# Patient Record
Sex: FEMALE | Race: WHITE | NOT HISPANIC OR LATINO | Employment: FULL TIME | URBAN - METROPOLITAN AREA
[De-identification: names, ages, dates, MRNs, and addresses within clinical notes are randomized per-mention and may not be internally consistent; named-entity substitution may affect disease eponyms.]

---

## 2023-08-19 PROBLEM — R60.0 EDEMA OF EXTREMITIES: Status: ACTIVE | Noted: 2020-03-10

## 2023-08-19 PROBLEM — B00.1 RECURRENT COLD SORES: Status: ACTIVE | Noted: 2020-03-10

## 2023-08-19 PROBLEM — J20.9 BRONCHOSPASM WITH BRONCHITIS, ACUTE: Status: ACTIVE | Noted: 2022-07-13

## 2023-08-19 PROBLEM — E78.00 ELEVATED CHOLESTEROL: Status: ACTIVE | Noted: 2021-11-11

## 2023-08-19 PROBLEM — E03.9 PRIMARY HYPOTHYROIDISM: Status: ACTIVE | Noted: 2021-04-27

## 2023-08-19 PROBLEM — E55.9 VITAMIN D DEFICIENCY: Status: ACTIVE | Noted: 2020-03-10

## 2023-08-19 PROBLEM — R92.8 ABNORMAL MAMMOGRAM: Status: ACTIVE | Noted: 2023-08-19

## 2023-08-19 PROBLEM — N95.1 HOT FLASHES DUE TO MENOPAUSE: Status: ACTIVE | Noted: 2021-04-27

## 2023-08-19 PROBLEM — E53.8 COBALAMIN DEFICIENCY: Status: ACTIVE | Noted: 2020-03-10

## 2023-08-19 PROBLEM — F41.9 ANXIETY: Status: ACTIVE | Noted: 2021-04-27

## 2023-08-19 PROBLEM — E78.2 MIXED HYPERLIPIDEMIA: Status: ACTIVE | Noted: 2022-05-24

## 2023-08-19 RX ORDER — LIFITEGRAST 50 MG/ML
SOLUTION/ DROPS OPHTHALMIC
COMMUNITY
Start: 2021-11-11

## 2023-08-19 RX ORDER — VITAMIN B COMPLEX
1 TABLET ORAL
COMMUNITY
Start: 2020-10-08 | End: 2024-04-10 | Stop reason: SDUPTHER

## 2023-08-19 RX ORDER — PNV NO.95/FERROUS FUM/FOLIC AC 28MG-0.8MG
TABLET ORAL
COMMUNITY
End: 2023-10-04

## 2023-08-19 RX ORDER — VENLAFAXINE HYDROCHLORIDE 150 MG/1
150 CAPSULE, EXTENDED RELEASE ORAL DAILY
COMMUNITY
Start: 2023-06-26 | End: 2023-10-04 | Stop reason: SDUPTHER

## 2023-08-19 RX ORDER — VIT C/E/ZN/COPPR/LUTEIN/ZEAXAN 250MG-90MG
CAPSULE ORAL
COMMUNITY
End: 2023-10-04

## 2023-08-19 RX ORDER — ESCITALOPRAM OXALATE 20 MG/1
TABLET ORAL
COMMUNITY
End: 2023-10-04

## 2023-08-19 RX ORDER — ACETAMINOPHEN 500 MG
TABLET ORAL DAILY
COMMUNITY
Start: 2020-03-10 | End: 2023-10-04

## 2023-08-19 RX ORDER — TRIAMTERENE/HYDROCHLOROTHIAZID 37.5-25 MG
1 TABLET ORAL DAILY PRN
COMMUNITY
Start: 2021-11-11 | End: 2023-10-04 | Stop reason: SDUPTHER

## 2023-08-19 RX ORDER — LEVOTHYROXINE SODIUM 50 UG/1
1 TABLET ORAL DAILY
COMMUNITY
Start: 2023-06-26 | End: 2023-10-04 | Stop reason: SDUPTHER

## 2023-08-19 RX ORDER — THYROID 15 MG/1
15 TABLET ORAL DAILY
COMMUNITY
Start: 2023-06-26 | End: 2023-10-04 | Stop reason: SDUPTHER

## 2023-10-04 ENCOUNTER — LAB (OUTPATIENT)
Dept: LAB | Facility: LAB | Age: 58
End: 2023-10-04
Payer: COMMERCIAL

## 2023-10-04 ENCOUNTER — OFFICE VISIT (OUTPATIENT)
Dept: PRIMARY CARE | Facility: CLINIC | Age: 58
End: 2023-10-04
Payer: COMMERCIAL

## 2023-10-04 VITALS
BODY MASS INDEX: 29.16 KG/M2 | DIASTOLIC BLOOD PRESSURE: 80 MMHG | WEIGHT: 162 LBS | OXYGEN SATURATION: 98 % | SYSTOLIC BLOOD PRESSURE: 138 MMHG | HEART RATE: 82 BPM | TEMPERATURE: 96.8 F

## 2023-10-04 DIAGNOSIS — E53.8 VITAMIN B12 DEFICIENCY: Chronic | ICD-10-CM

## 2023-10-04 DIAGNOSIS — E06.3 HYPOTHYROIDISM DUE TO HASHIMOTO'S THYROIDITIS: Chronic | ICD-10-CM

## 2023-10-04 DIAGNOSIS — E03.8 HYPOTHYROIDISM DUE TO HASHIMOTO'S THYROIDITIS: Chronic | ICD-10-CM

## 2023-10-04 DIAGNOSIS — Z01.89 ENCOUNTER FOR ROUTINE LABORATORY TESTING: ICD-10-CM

## 2023-10-04 DIAGNOSIS — E78.2 MIXED HYPERLIPIDEMIA: Chronic | ICD-10-CM

## 2023-10-04 DIAGNOSIS — R73.9 HYPERGLYCEMIA: ICD-10-CM

## 2023-10-04 DIAGNOSIS — F41.9 ANXIETY: Chronic | ICD-10-CM

## 2023-10-04 DIAGNOSIS — E55.9 VITAMIN D DEFICIENCY: Chronic | ICD-10-CM

## 2023-10-04 DIAGNOSIS — G43.909 MIGRAINE SYNDROME: Chronic | ICD-10-CM

## 2023-10-04 DIAGNOSIS — Z12.31 ENCOUNTER FOR SCREENING MAMMOGRAM FOR BREAST CANCER: ICD-10-CM

## 2023-10-04 DIAGNOSIS — Z13.6 ENCOUNTER FOR SCREENING FOR CARDIOVASCULAR DISORDERS: ICD-10-CM

## 2023-10-04 DIAGNOSIS — E78.2 MIXED HYPERLIPIDEMIA: Primary | Chronic | ICD-10-CM

## 2023-10-04 DIAGNOSIS — I87.2 VENOUS INSUFFICIENCY: Chronic | ICD-10-CM

## 2023-10-04 LAB
25(OH)D3 SERPL-MCNC: 27 NG/ML (ref 31–100)
ALBUMIN SERPL-MCNC: 4.6 G/DL (ref 3.5–5)
ALP BLD-CCNC: 82 U/L (ref 35–125)
ALT SERPL-CCNC: 14 U/L (ref 5–40)
ANION GAP SERPL CALC-SCNC: 10 MMOL/L
AST SERPL-CCNC: 17 U/L (ref 5–40)
BASOPHILS # BLD AUTO: 0.02 X10*3/UL (ref 0–0.1)
BASOPHILS NFR BLD AUTO: 0.6 %
BILIRUB DIRECT SERPL-MCNC: <0.2 MG/DL (ref 0–0.2)
BILIRUB SERPL-MCNC: 0.4 MG/DL (ref 0.1–1.2)
BUN SERPL-MCNC: 11 MG/DL (ref 8–25)
CALCIUM SERPL-MCNC: 9.5 MG/DL (ref 8.5–10.4)
CHLORIDE SERPL-SCNC: 101 MMOL/L (ref 97–107)
CHOLEST SERPL-MCNC: 269 MG/DL (ref 133–200)
CHOLEST/HDLC SERPL: 4.8 {RATIO}
CO2 SERPL-SCNC: 25 MMOL/L (ref 24–31)
CREAT SERPL-MCNC: 0.8 MG/DL (ref 0.4–1.6)
EOSINOPHIL # BLD AUTO: 0 X10*3/UL (ref 0–0.7)
EOSINOPHIL NFR BLD AUTO: 0 %
ERYTHROCYTE [DISTWIDTH] IN BLOOD BY AUTOMATED COUNT: 12.1 % (ref 11.5–14.5)
EST. AVERAGE GLUCOSE BLD GHB EST-MCNC: 97 MG/DL
GFR SERPL CREATININE-BSD FRML MDRD: 85 ML/MIN/1.73M*2
GLUCOSE SERPL-MCNC: 81 MG/DL (ref 65–99)
HBA1C MFR BLD: 5 %
HCT VFR BLD AUTO: 39.6 % (ref 36–46)
HDLC SERPL-MCNC: 56 MG/DL
HGB BLD-MCNC: 13.5 G/DL (ref 12–16)
IMM GRANULOCYTES # BLD AUTO: 0.01 X10*3/UL (ref 0–0.7)
IMM GRANULOCYTES NFR BLD AUTO: 0.3 % (ref 0–0.9)
LDLC SERPL CALC-MCNC: 174 MG/DL (ref 65–130)
LYMPHOCYTES # BLD AUTO: 1.66 X10*3/UL (ref 1.2–4.8)
LYMPHOCYTES NFR BLD AUTO: 48.4 %
MCH RBC QN AUTO: 30.5 PG (ref 26–34)
MCHC RBC AUTO-ENTMCNC: 34.1 G/DL (ref 32–36)
MCV RBC AUTO: 89 FL (ref 80–100)
MONOCYTES # BLD AUTO: 0.18 X10*3/UL (ref 0.1–1)
MONOCYTES NFR BLD AUTO: 5.2 %
NEUTROPHILS # BLD AUTO: 1.56 X10*3/UL (ref 1.2–7.7)
NEUTROPHILS NFR BLD AUTO: 45.5 %
NRBC BLD-RTO: 0 /100 WBCS (ref 0–0)
PLATELET # BLD AUTO: 227 X10*3/UL (ref 150–450)
PMV BLD AUTO: 10 FL (ref 7.5–11.5)
POTASSIUM SERPL-SCNC: 4.2 MMOL/L (ref 3.4–5.1)
PROT SERPL-MCNC: 6.9 G/DL (ref 5.9–7.9)
RBC # BLD AUTO: 4.43 X10*6/UL (ref 4–5.2)
SODIUM SERPL-SCNC: 136 MMOL/L (ref 133–145)
T3 SERPL-MCNC: 105 NG/DL (ref 60–200)
T4 FREE SERPL-MCNC: 1.2 NG/DL (ref 0.9–1.7)
TRIGL SERPL-MCNC: 193 MG/DL (ref 40–150)
TSH SERPL DL<=0.05 MIU/L-ACNC: 1.86 MIU/L (ref 0.27–4.2)
VIT B12 SERPL-MCNC: 703 PG/ML (ref 211–946)
WBC # BLD AUTO: 3.4 X10*3/UL (ref 4.4–11.3)

## 2023-10-04 PROCEDURE — 99214 OFFICE O/P EST MOD 30 MIN: CPT | Performed by: STUDENT IN AN ORGANIZED HEALTH CARE EDUCATION/TRAINING PROGRAM

## 2023-10-04 PROCEDURE — 84480 ASSAY TRIIODOTHYRONINE (T3): CPT

## 2023-10-04 PROCEDURE — 36415 COLL VENOUS BLD VENIPUNCTURE: CPT

## 2023-10-04 PROCEDURE — 1036F TOBACCO NON-USER: CPT | Performed by: STUDENT IN AN ORGANIZED HEALTH CARE EDUCATION/TRAINING PROGRAM

## 2023-10-04 RX ORDER — LEVOTHYROXINE SODIUM 50 UG/1
50 TABLET ORAL DAILY
Qty: 90 TABLET | Refills: 3 | Status: SHIPPED | OUTPATIENT
Start: 2023-10-04 | End: 2024-04-10 | Stop reason: SDUPTHER

## 2023-10-04 RX ORDER — VENLAFAXINE HYDROCHLORIDE 150 MG/1
150 CAPSULE, EXTENDED RELEASE ORAL DAILY
Qty: 90 CAPSULE | Refills: 3 | Status: SHIPPED | OUTPATIENT
Start: 2023-10-04 | End: 2024-04-10 | Stop reason: SDUPTHER

## 2023-10-04 RX ORDER — TRIAMTERENE/HYDROCHLOROTHIAZID 37.5-25 MG
1 TABLET ORAL DAILY PRN
Start: 2023-10-04 | End: 2024-04-10 | Stop reason: SDUPTHER

## 2023-10-04 RX ORDER — THYROID 15 MG/1
15 TABLET ORAL DAILY
Qty: 90 TABLET | Refills: 3 | Status: SHIPPED | OUTPATIENT
Start: 2023-10-04 | End: 2023-10-04

## 2023-10-04 RX ORDER — THYROID 15 MG/1
15 TABLET ORAL DAILY
Qty: 90 TABLET | Refills: 3 | Status: SHIPPED | OUTPATIENT
Start: 2023-10-04 | End: 2024-01-29 | Stop reason: SDUPTHER

## 2023-10-04 ASSESSMENT — ENCOUNTER SYMPTOMS
DEPRESSION: 0
GASTROINTESTINAL NEGATIVE: 1
LOSS OF SENSATION IN FEET: 0
CONSTITUTIONAL NEGATIVE: 1
OCCASIONAL FEELINGS OF UNSTEADINESS: 0
RESPIRATORY NEGATIVE: 1
CARDIOVASCULAR NEGATIVE: 1

## 2023-10-04 ASSESSMENT — PAIN SCALES - GENERAL: PAINLEVEL: 0-NO PAIN

## 2023-10-04 NOTE — PATIENT INSTRUCTIONS
Diagnoses and all orders for this visit:  Mixed hyperlipidemia  -     Lipid Panel; Future  -     CT angio coronary art with heartflow if score >30%; Future  Hypothyroidism due to Hashimoto's thyroiditis  Comments:  - Patient being treated with levothyroxine and thyroid armour.  - Needs labs every 6 months.  Orders:  -     thyroid, pork, ([place] Thyroid) 15 mg tablet; Take 1 tablet (15 mg) by mouth once daily.  -     levothyroxine (Synthroid, Levoxyl) 50 mcg tablet; Take 1 tablet (50 mcg) by mouth once daily.  -     Thyroid Stimulating Hormone; Future  -     Thyroxine, Free; Future  -     Tsh With Reflex To Free T4 If Abnormal; Future  -     T4, free; Future  -     T3; Future  -     T3; Future  Vitamin B12 deficiency  -     Vitamin B12; Future  Vitamin D deficiency  -     Vitamin D 25-Hydroxy,Total (for eval of Vitamin D levels); Future  Venous insufficiency  Comments:  - Patient takes triamterene-HCTZ as-needed for this.  - Is working well, will not make changes.  Orders:  -     triamterene-hydrochlorothiazid (Maxzide-25) 37.5-25 mg tablet; Take 1 tablet by mouth once daily as needed (swelling).  Migraine syndrome  Comments:  - Has not been giving us problems for awhile.  Anxiety  Comments:  - Stable. No changes at this time.  - Blood pressure mildly elevated today, but I suspect this is anxiety-related.  Orders:  -     venlafaxine XR (Effexor-XR) 150 mg 24 hr capsule; Take 1 capsule (150 mg) by mouth once daily.  Encounter for routine laboratory testing  Comments:  - Will order bulk of labwork for now.  - Will also order relevant 6-month labs.  Orders:  -     Thyroid Stimulating Hormone; Future  -     Thyroxine, Free; Future  -     Basic Metabolic Panel; Future  -     CBC and Auto Differential; Future  -     Hemoglobin A1C; Future  -     Hepatic Function Panel; Future  -     Lipid Panel; Future  -     Vitamin D 25-Hydroxy,Total (for eval of Vitamin D levels); Future  -     Basic Metabolic Panel; Future  -      Hepatic Function Panel; Future  -     CBC and Auto Differential; Future  -     Tsh With Reflex To Free T4 If Abnormal; Future  -     T4, free; Future  -     T3; Future  -     T3; Future  -     Vitamin B12; Future  Hyperglycemia  -     Hemoglobin A1C; Future  Encounter for screening for cardiovascular disorders  -     CT angio coronary art with heartflow if score >30%; Future  Encounter for screening mammogram for breast cancer  -     BI mammo bilateral screening tomosynthesis; Future

## 2023-10-04 NOTE — PROGRESS NOTES
[facility]: [facility]  PROGRESS NOTE      Rebeca Nicolas is a 58 y.o. female that is presenting today for Establish Care (NP).    ASSESSMENT / PLAN:  Diagnoses and all orders for this visit:  Mixed hyperlipidemia  -     Lipid Panel; Future  -     CT angio coronary art with heartflow if score >30%; Future  Hypothyroidism due to Hashimoto's thyroiditis  Comments:  - Patient being treated with levothyroxine and thyroid armour.  - Needs labs every 6 months.  Orders:  -     thyroid, pork, ([place] Thyroid) 15 mg tablet; Take 1 tablet (15 mg) by mouth once daily.  -     levothyroxine (Synthroid, Levoxyl) 50 mcg tablet; Take 1 tablet (50 mcg) by mouth once daily.  -     Thyroid Stimulating Hormone; Future  -     Thyroxine, Free; Future  -     Tsh With Reflex To Free T4 If Abnormal; Future  -     T4, free; Future  -     T3; Future  -     T3; Future  Vitamin B12 deficiency  -     Vitamin B12; Future  Vitamin D deficiency  -     Vitamin D 25-Hydroxy,Total (for eval of Vitamin D levels); Future  Venous insufficiency  Comments:  - Patient takes triamterene-HCTZ as-needed for this.  - Is working well, will not make changes.  Orders:  -     triamterene-hydrochlorothiazid (Maxzide-25) 37.5-25 mg tablet; Take 1 tablet by mouth once daily as needed (swelling).  Migraine syndrome  Comments:  - Has not been giving us problems for awhile.  Anxiety  Comments:  - Stable. No changes at this time.  - Blood pressure mildly elevated today, but I suspect this is anxiety-related.  Orders:  -     venlafaxine XR (Effexor-XR) 150 mg 24 hr capsule; Take 1 capsule (150 mg) by mouth once daily.  Encounter for routine laboratory testing  -     Thyroid Stimulating Hormone; Future  -     Thyroxine, Free; Future  -     Basic Metabolic Panel; Future  -     CBC and Auto Differential; Future  -     Hemoglobin A1C; Future  -     Hepatic Function Panel; Future  -     Lipid Panel; Future  -     Vitamin D 25-Hydroxy,Total (for eval of  Vitamin D levels); Future  -     Basic Metabolic Panel; Future  -     Hepatic Function Panel; Future  -     CBC and Auto Differential; Future  -     Tsh With Reflex To Free T4 If Abnormal; Future  -     T4, free; Future  -     T3; Future  -     T3; Future  -     Vitamin B12; Future  Hyperglycemia  -     Hemoglobin A1C; Future  Encounter for screening for cardiovascular disorders  -     CT angio coronary art with heartflow if score >30%; Future  Encounter for screening mammogram for breast cancer  -     BI mammo bilateral screening tomosynthesis; Future    Subjective   - The patient feels well and denies any acute symptoms or concerns at this time.   - The patient denies any changes or progression of their chronic medical problems.  - The patient denies any problems or concerns with their medications.    Review of Systems   Constitutional: Negative.    Respiratory: Negative.     Cardiovascular: Negative.    Gastrointestinal: Negative.       Objective   Vitals:    10/04/23 0850   BP: 138/80   Pulse: 82   Temp: 36 °C (96.8 °F)   SpO2: 98%      Body mass index is 29.16 kg/m².  Physical Exam  Constitutional:       General: She is not in acute distress.  Neck:      Vascular: No carotid bruit.   Cardiovascular:      Rate and Rhythm: Normal rate and regular rhythm.      Heart sounds: Normal heart sounds.   Pulmonary:      Effort: Pulmonary effort is normal.      Breath sounds: Normal breath sounds.   Musculoskeletal:         General: No swelling.   Neurological:      Mental Status: She is alert. Mental status is at baseline.   Psychiatric:         Mood and Affect: Mood normal.     Diagnostic Results   Lab Results   Component Value Date    GLUCOSE 77 10/08/2020    CALCIUM 9.7 10/08/2020     10/08/2020    K 3.8 10/08/2020    CO2 25 10/08/2020     10/08/2020    BUN 16 10/08/2020    CREATININE 0.8 10/08/2020     Lab Results   Component Value Date    ALT 17 03/10/2020    AST 19 03/10/2020    ALKPHOS 82 03/10/2020     "BILITOT 0.4 03/10/2020     No results found for: \"WBC\", \"HGB\", \"HCT\", \"MCV\", \"PLT\"  No results found for: \"CHOL\"  No results found for: \"HDL\"  No results found for: \"LDLCALC\"  No results found for: \"TRIG\"  No components found for: \"CHOLHDL\"  No results found for: \"HGBA1C\"  Other labs not included in the list above were reviewed either before or during this encounter.    History    Past Medical History:   Diagnosis Date   • Anxiety and depression    • Thyroid disease      Past Surgical History:   Procedure Laterality Date   • CHOLECYSTECTOMY  05/15/2014    Cholecystectomy   • OTHER SURGICAL HISTORY  05/15/2014    Gynecologic Serv Endometrial Cryoablation W/ Ultrason Guid   • OTHER SURGICAL HISTORY  11/29/2018    Hand surgery     Family History   Problem Relation Name Age of Onset   • Diabetes Mother     • Hypertension Mother     • No Known Problems Father       No Known Allergies  Current Outpatient Medications on File Prior to Visit   Medication Sig Dispense Refill   • cyanocobalamin, vitamin B-12, (Vitamin B-12) 2,500 mcg tablet, sublingual Take 1 tablet (2,500 mcg) by mouth every 3 days.     • lifitegrast (Xiidra) 5 % dropperette 0 Refill(s), Type: Maintenance     • [DISCONTINUED] cyanocobalamin (Vitamin B-12) 100 mcg tablet Take by mouth.     • [DISCONTINUED] levothyroxine (Synthroid, Levoxyl) 50 mcg tablet Take 1 tablet (50 mcg) by mouth once daily.     • [DISCONTINUED] thyroid, pork, ([place] Thyroid) 15 mg tablet Take 1 tablet (15 mg) by mouth once daily. medically necessary to take with levothyroxine     • [DISCONTINUED] triamterene-hydrochlorothiazid (Maxzide-25) 37.5-25 mg tablet Take 1 tablet by mouth once daily as needed.     • [DISCONTINUED] venlafaxine XR (Effexor-XR) 150 mg 24 hr capsule Take 1 capsule (150 mg) by mouth once daily.     • [DISCONTINUED] cholecalciferol (Vitamin D-3) 25 MCG (1000 UT) capsule Take by mouth.     • [DISCONTINUED] cholecalciferol (Vitamin D-3) 5,000 Units tablet Take by mouth " once daily.     • [DISCONTINUED] escitalopram (Lexapro) 20 mg tablet Take by mouth.     • [DISCONTINUED] omega-3 fatty acids-fish oil (Fish OiL) 360-1,200 mg capsule Take by mouth.       No current facility-administered medications on file prior to visit.     Immunization History   Administered Date(s) Administered   • Influenza Whole 09/25/2013   • Influenza, Unspecified 11/11/2003, 09/25/2021   • [employer] SARS-CoV-2 Vaccination 04/12/2021   • Pfizer COVID-19 vaccine, bivalent, age 12 years and older (30 mcg/0.3 mL) 11/23/2022, 11/23/2022   • Pfizer Purple Cap SARS-CoV-2 12/07/2021   • Tdap vaccine, age 7 year and older (BOOSTRIX) 09/29/2015, 12/08/2019   • Tetanus Toxoid, Unspecified 09/29/2015     Patient's medical history was reviewed and updated either before or during this encounter.    Jamie Reece MD

## 2023-10-05 DIAGNOSIS — E78.2 MIXED HYPERLIPIDEMIA: Primary | ICD-10-CM

## 2023-10-05 DIAGNOSIS — D70.9 NEUTROPENIA, UNSPECIFIED TYPE (CMS-HCC): ICD-10-CM

## 2023-10-05 RX ORDER — ROSUVASTATIN CALCIUM 5 MG/1
5 TABLET, COATED ORAL DAILY
Qty: 90 TABLET | Refills: 3 | Status: SHIPPED | OUTPATIENT
Start: 2023-10-05 | End: 2024-04-10 | Stop reason: ALTCHOICE

## 2023-10-11 ENCOUNTER — TELEPHONE (OUTPATIENT)
Dept: PRIMARY CARE | Facility: CLINIC | Age: 58
End: 2023-10-11
Payer: COMMERCIAL

## 2023-10-11 DIAGNOSIS — Z00.00 ENCOUNTER FOR SCREENING AND PREVENTATIVE CARE: ICD-10-CM

## 2023-10-16 ENCOUNTER — HOSPITAL ENCOUNTER (OUTPATIENT)
Dept: RADIOLOGY | Facility: HOSPITAL | Age: 58
Discharge: HOME | End: 2023-10-16
Payer: COMMERCIAL

## 2023-10-16 DIAGNOSIS — Z00.00 ENCOUNTER FOR SCREENING AND PREVENTATIVE CARE: ICD-10-CM

## 2023-10-16 PROCEDURE — 75571 CT HRT W/O DYE W/CA TEST: CPT

## 2023-10-18 ENCOUNTER — TELEPHONE (OUTPATIENT)
Dept: PRIMARY CARE | Facility: CLINIC | Age: 58
End: 2023-10-18
Payer: COMMERCIAL

## 2023-10-25 ENCOUNTER — TELEPHONE (OUTPATIENT)
Dept: PRIMARY CARE | Facility: CLINIC | Age: 58
End: 2023-10-25
Payer: COMMERCIAL

## 2024-01-29 ENCOUNTER — TELEPHONE (OUTPATIENT)
Dept: PRIMARY CARE | Facility: CLINIC | Age: 59
End: 2024-01-29
Payer: COMMERCIAL

## 2024-01-29 ENCOUNTER — HOSPITAL ENCOUNTER (OUTPATIENT)
Dept: RADIOLOGY | Facility: HOSPITAL | Age: 59
Discharge: HOME | End: 2024-01-29
Payer: COMMERCIAL

## 2024-01-29 VITALS — BODY MASS INDEX: 28.35 KG/M2 | WEIGHT: 160 LBS | HEIGHT: 63 IN

## 2024-01-29 DIAGNOSIS — Z12.31 ENCOUNTER FOR SCREENING MAMMOGRAM FOR BREAST CANCER: ICD-10-CM

## 2024-01-29 DIAGNOSIS — E06.3 HYPOTHYROIDISM DUE TO HASHIMOTO'S THYROIDITIS: Chronic | ICD-10-CM

## 2024-01-29 DIAGNOSIS — E03.8 HYPOTHYROIDISM DUE TO HASHIMOTO'S THYROIDITIS: Chronic | ICD-10-CM

## 2024-01-29 PROCEDURE — 77067 SCR MAMMO BI INCL CAD: CPT

## 2024-01-30 RX ORDER — THYROID 15 MG/1
15 TABLET ORAL DAILY
Qty: 90 TABLET | Refills: 3 | Status: SHIPPED | OUTPATIENT
Start: 2024-01-30 | End: 2024-04-10 | Stop reason: SDUPTHER

## 2024-02-01 ENCOUNTER — APPOINTMENT (OUTPATIENT)
Dept: RADIOLOGY | Facility: HOSPITAL | Age: 59
End: 2024-02-01
Payer: COMMERCIAL

## 2024-04-10 ENCOUNTER — OFFICE VISIT (OUTPATIENT)
Dept: PRIMARY CARE | Facility: CLINIC | Age: 59
End: 2024-04-10
Payer: COMMERCIAL

## 2024-04-10 ENCOUNTER — LAB (OUTPATIENT)
Dept: LAB | Facility: LAB | Age: 59
End: 2024-04-10
Payer: COMMERCIAL

## 2024-04-10 VITALS
BODY MASS INDEX: 28.7 KG/M2 | OXYGEN SATURATION: 97 % | WEIGHT: 162 LBS | SYSTOLIC BLOOD PRESSURE: 122 MMHG | HEIGHT: 63 IN | HEART RATE: 83 BPM | DIASTOLIC BLOOD PRESSURE: 80 MMHG

## 2024-04-10 DIAGNOSIS — E78.2 MIXED HYPERLIPIDEMIA: ICD-10-CM

## 2024-04-10 DIAGNOSIS — D70.9 NEUTROPENIA, UNSPECIFIED TYPE (CMS-HCC): ICD-10-CM

## 2024-04-10 DIAGNOSIS — R73.9 HYPERGLYCEMIA: ICD-10-CM

## 2024-04-10 DIAGNOSIS — E55.9 VITAMIN D DEFICIENCY: ICD-10-CM

## 2024-04-10 DIAGNOSIS — E03.8 HYPOTHYROIDISM DUE TO HASHIMOTO'S THYROIDITIS: Primary | ICD-10-CM

## 2024-04-10 DIAGNOSIS — Z01.89 ENCOUNTER FOR ROUTINE LABORATORY TESTING: ICD-10-CM

## 2024-04-10 DIAGNOSIS — G43.909 MIGRAINE SYNDROME: ICD-10-CM

## 2024-04-10 DIAGNOSIS — E53.8 VITAMIN B12 DEFICIENCY: ICD-10-CM

## 2024-04-10 DIAGNOSIS — F41.9 ANXIETY: ICD-10-CM

## 2024-04-10 DIAGNOSIS — I87.2 VENOUS INSUFFICIENCY: ICD-10-CM

## 2024-04-10 DIAGNOSIS — E03.8 HYPOTHYROIDISM DUE TO HASHIMOTO'S THYROIDITIS: ICD-10-CM

## 2024-04-10 DIAGNOSIS — E06.3 HYPOTHYROIDISM DUE TO HASHIMOTO'S THYROIDITIS: ICD-10-CM

## 2024-04-10 DIAGNOSIS — E06.3 HYPOTHYROIDISM DUE TO HASHIMOTO'S THYROIDITIS: Primary | ICD-10-CM

## 2024-04-10 PROBLEM — J20.9 BRONCHOSPASM WITH BRONCHITIS, ACUTE: Status: RESOLVED | Noted: 2022-07-13 | Resolved: 2024-04-10

## 2024-04-10 PROBLEM — E78.5 HLD (HYPERLIPIDEMIA): Status: ACTIVE | Noted: 2022-05-24

## 2024-04-10 LAB
BASOPHILS # BLD AUTO: 0.01 X10*3/UL (ref 0–0.1)
BASOPHILS NFR BLD AUTO: 0.3 %
EOSINOPHIL # BLD AUTO: 0 X10*3/UL (ref 0–0.7)
EOSINOPHIL NFR BLD AUTO: 0 %
ERYTHROCYTE [DISTWIDTH] IN BLOOD BY AUTOMATED COUNT: 12.7 % (ref 11.5–14.5)
FOLATE SERPL-MCNC: 13.4 NG/ML (ref 4.2–19.9)
HCT VFR BLD AUTO: 40.7 % (ref 36–46)
HGB BLD-MCNC: 13.5 G/DL (ref 12–16)
IMM GRANULOCYTES # BLD AUTO: 0 X10*3/UL (ref 0–0.7)
IMM GRANULOCYTES NFR BLD AUTO: 0 % (ref 0–0.9)
LYMPHOCYTES # BLD AUTO: 1.34 X10*3/UL (ref 1.2–4.8)
LYMPHOCYTES NFR BLD AUTO: 38.6 %
MCH RBC QN AUTO: 30.3 PG (ref 26–34)
MCHC RBC AUTO-ENTMCNC: 33.2 G/DL (ref 32–36)
MCV RBC AUTO: 91 FL (ref 80–100)
MONOCYTES # BLD AUTO: 0.18 X10*3/UL (ref 0.1–1)
MONOCYTES NFR BLD AUTO: 5.2 %
NEUTROPHILS # BLD AUTO: 1.94 X10*3/UL (ref 1.2–7.7)
NEUTROPHILS NFR BLD AUTO: 55.9 %
NRBC BLD-RTO: 0 /100 WBCS (ref 0–0)
PLATELET # BLD AUTO: 218 X10*3/UL (ref 150–450)
RBC # BLD AUTO: 4.46 X10*6/UL (ref 4–5.2)
TSH SERPL DL<=0.05 MIU/L-ACNC: 0.9 MIU/L (ref 0.27–4.2)
VIT B12 SERPL-MCNC: 648 PG/ML (ref 211–946)
WBC # BLD AUTO: 3.5 X10*3/UL (ref 4.4–11.3)

## 2024-04-10 PROCEDURE — 82607 VITAMIN B-12: CPT

## 2024-04-10 PROCEDURE — 99214 OFFICE O/P EST MOD 30 MIN: CPT | Performed by: STUDENT IN AN ORGANIZED HEALTH CARE EDUCATION/TRAINING PROGRAM

## 2024-04-10 PROCEDURE — 82746 ASSAY OF FOLIC ACID SERUM: CPT

## 2024-04-10 PROCEDURE — 84443 ASSAY THYROID STIM HORMONE: CPT

## 2024-04-10 PROCEDURE — 1036F TOBACCO NON-USER: CPT | Performed by: STUDENT IN AN ORGANIZED HEALTH CARE EDUCATION/TRAINING PROGRAM

## 2024-04-10 PROCEDURE — 36415 COLL VENOUS BLD VENIPUNCTURE: CPT

## 2024-04-10 PROCEDURE — 85025 COMPLETE CBC W/AUTO DIFF WBC: CPT

## 2024-04-10 RX ORDER — THYROID 15 MG/1
15 TABLET ORAL DAILY
Qty: 90 TABLET | Refills: 3 | Status: SHIPPED | OUTPATIENT
Start: 2024-04-10 | End: 2025-04-10

## 2024-04-10 RX ORDER — MULTIVITAMIN
1 TABLET ORAL DAILY
Start: 2024-04-10

## 2024-04-10 RX ORDER — LEVOTHYROXINE SODIUM 50 UG/1
50 TABLET ORAL DAILY
Qty: 90 TABLET | Refills: 3 | Status: SHIPPED | OUTPATIENT
Start: 2024-04-10 | End: 2025-04-10

## 2024-04-10 RX ORDER — VITAMIN B COMPLEX
1 TABLET ORAL
Start: 2024-04-10

## 2024-04-10 RX ORDER — VENLAFAXINE HYDROCHLORIDE 150 MG/1
150 CAPSULE, EXTENDED RELEASE ORAL DAILY
Qty: 90 CAPSULE | Refills: 3 | Status: SHIPPED | OUTPATIENT
Start: 2024-04-10 | End: 2025-04-10

## 2024-04-10 RX ORDER — TRIAMTERENE/HYDROCHLOROTHIAZID 37.5-25 MG
1 TABLET ORAL DAILY PRN
Qty: 90 TABLET | Refills: 0 | Status: SHIPPED | OUTPATIENT
Start: 2024-04-10

## 2024-04-10 ASSESSMENT — ENCOUNTER SYMPTOMS
GASTROINTESTINAL NEGATIVE: 1
CARDIOVASCULAR NEGATIVE: 1
CONSTITUTIONAL NEGATIVE: 1
RESPIRATORY NEGATIVE: 1

## 2024-04-10 ASSESSMENT — PATIENT HEALTH QUESTIONNAIRE - PHQ9
1. LITTLE INTEREST OR PLEASURE IN DOING THINGS: NOT AT ALL
2. FEELING DOWN, DEPRESSED OR HOPELESS: NOT AT ALL
SUM OF ALL RESPONSES TO PHQ9 QUESTIONS 1 AND 2: 0

## 2024-04-10 ASSESSMENT — PAIN SCALES - GENERAL: PAINLEVEL: 0-NO PAIN

## 2024-04-10 NOTE — PROGRESS NOTES
[facility]: [facility]  PROGRESS NOTE      Rebeca Nicolas is a 58 y.o. female that is presenting today for Follow-up (6 month ).    Assessment/Plan   Diagnoses and all orders for this visit:  Hypothyroidism due to Hashimoto's thyroiditis  -     levothyroxine (Synthroid, Levoxyl) 50 mcg tablet; Take 1 tablet (50 mcg) by mouth once daily.  -     thyroid, pork, 15 mg tablet; Take 1 tablet (15 mg) by mouth once daily.  -     TSH with reflex to Free T4 if abnormal; Future  -     TSH with reflex to Free T4 if abnormal; Future  Neutropenia, unspecified type (CMS/HCC)  -     CBC and Auto Differential; Future  -     CBC and Auto Differential; Future  Vitamin B12 deficiency  -     cyanocobalamin, vitamin B-12, (Vitamin B-12) 2,500 mcg tablet, sublingual SL tablet; Take 1 tablet (2,500 mcg) by mouth every 3 days.  -     multivitamin tablet; Take 1 tablet by mouth once daily. One-A-Day Women's 50+ Complete Multivitamin  -     Vitamin B12; Future  -     Folate; Future  Mixed hyperlipidemia  -     Hepatic Function Panel; Future  -     Lipid Panel; Future  Vitamin D deficiency  -     multivitamin tablet; Take 1 tablet by mouth once daily. One-A-Day Women's 50+ Complete Multivitamin  -     Vitamin D 25-Hydroxy,Total (for eval of Vitamin D levels); Future  Venous insufficiency  -     triamterene-hydrochlorothiazid (Maxzide-25) 37.5-25 mg tablet; Take 1 tablet by mouth once daily as needed (swelling).  Migraine syndrome  Anxiety  -     venlafaxine XR (Effexor-XR) 150 mg 24 hr capsule; Take 1 capsule (150 mg) by mouth once daily.  Encounter for routine laboratory testing  -     TSH with reflex to Free T4 if abnormal; Future  -     CBC and Auto Differential; Future  -     Basic Metabolic Panel; Future  -     Hepatic Function Panel; Future  -     Lipid Panel; Future  -     Hemoglobin A1C; Future  -     Vitamin D 25-Hydroxy,Total (for eval of Vitamin D levels); Future  -     TSH with reflex to Free T4 if abnormal;  Future  -     Vitamin B12; Future  -     Folate; Future  -     CBC and Auto Differential; Future  -     Follow Up In Primary Care; Future  Hyperglycemia  -     Hemoglobin A1C; Future    - Significant medication and problem list reconciliation done today.  - Blood pressure looks great. No changes at this time.  - Patient needs mild non-fasting labwork. Ordered today. Will also order fasting labwork for the patient's next appointment.  - Patient noting some stressors / anxiety. Counseled lifestyle modification. I do not think that we need to peggy with medication at this time.    Subjective   - The patient otherwise feels well and denies any acute symptoms or concerns at this time.  - The patient denies any changes or progression of their chronic medical problems.  - The patient denies any problems or concerns with their medications.      Review of Systems   Constitutional: Negative.    Respiratory: Negative.     Cardiovascular: Negative.    Gastrointestinal: Negative.    All other systems reviewed and are negative.     Objective   Vitals:    04/10/24 0905   BP: 122/80   Pulse: 83   SpO2: 97%      Body mass index is 28.7 kg/m².  Physical Exam  Vitals and nursing note reviewed.   Constitutional:       General: She is not in acute distress.  Neck:      Vascular: No carotid bruit.   Cardiovascular:      Rate and Rhythm: Normal rate and regular rhythm.      Heart sounds: Normal heart sounds.   Pulmonary:      Effort: Pulmonary effort is normal.      Breath sounds: Normal breath sounds.   Musculoskeletal:         General: No swelling.   Neurological:      Mental Status: She is alert. Mental status is at baseline.   Psychiatric:         Mood and Affect: Mood normal.       Diagnostic Results   Lab Results   Component Value Date    GLUCOSE 81 10/04/2023    CALCIUM 9.5 10/04/2023     10/04/2023    K 4.2 10/04/2023    CO2 25 10/04/2023     10/04/2023    BUN 11 10/04/2023    CREATININE 0.80 10/04/2023     Lab  "Results   Component Value Date    ALT 14 10/04/2023    AST 17 10/04/2023    ALKPHOS 82 10/04/2023    BILITOT 0.4 10/04/2023     Lab Results   Component Value Date    WBC 3.4 (L) 10/04/2023    HGB 13.5 10/04/2023    HCT 39.6 10/04/2023    MCV 89 10/04/2023     10/04/2023     Lab Results   Component Value Date    CHOL 269 (H) 10/04/2023     Lab Results   Component Value Date    HDL 56.0 10/04/2023     Lab Results   Component Value Date    LDLCALC 174 (H) 10/04/2023     Lab Results   Component Value Date    TRIG 193 (H) 10/04/2023     No components found for: \"CHOLHDL\"  Lab Results   Component Value Date    HGBA1C 5.0 10/04/2023     Other labs not included in the list above were reviewed either before or during this encounter.    History    Past Medical History:   Diagnosis Date    Anxiety and depression     Thyroid disease      Past Surgical History:   Procedure Laterality Date    BREAST BIOPSY Left     long ago    CHOLECYSTECTOMY  05/15/2014    Cholecystectomy    OTHER SURGICAL HISTORY  05/15/2014    Gynecologic Serv Endometrial Cryoablation W/ Ultrason Guid    OTHER SURGICAL HISTORY  11/29/2018    Hand surgery     Family History   Problem Relation Name Age of Onset    Diabetes Mother      Hypertension Mother      No Known Problems Father       Social History     Socioeconomic History    Marital status:      Spouse name: Not on file    Number of children: Not on file    Years of education: Not on file    Highest education level: Not on file   Occupational History    Not on file   Tobacco Use    Smoking status: Never    Smokeless tobacco: Never   Vaping Use    Vaping status: Never Used   Substance and Sexual Activity    Alcohol use: Yes     Alcohol/week: 1.0 - 2.0 standard drink of alcohol     Types: 1 - 2 Glasses of wine per week     Comment: a couple of times a week    Drug use: Never    Sexual activity: Not on file   Other Topics Concern    Not on file   Social History Narrative    Not on file "     Social Determinants of Health     Financial Resource Strain: Not on file   Food Insecurity: Not on file   Transportation Needs: Not on file   Physical Activity: Not on file   Stress: Not on file   Social Connections: Not on file   Intimate Partner Violence: Not on file   Housing Stability: Not on file     No Known Allergies  Current Outpatient Medications on File Prior to Visit   Medication Sig Dispense Refill    lifitegrast (Xiidra) 5 % dropperette 0 Refill(s), Type: Maintenance      [DISCONTINUED] cyanocobalamin, vitamin B-12, (Vitamin B-12) 2,500 mcg tablet, sublingual Take 1 tablet (2,500 mcg) by mouth every 3 days.      [DISCONTINUED] levothyroxine (Synthroid, Levoxyl) 50 mcg tablet Take 1 tablet (50 mcg) by mouth once daily. 90 tablet 3    [DISCONTINUED] thyroid, pork, 15 mg tablet Take 1 tablet (15 mg) by mouth once daily. 90 tablet 3    [DISCONTINUED] triamterene-hydrochlorothiazid (Maxzide-25) 37.5-25 mg tablet Take 1 tablet by mouth once daily as needed (swelling).      [DISCONTINUED] venlafaxine XR (Effexor-XR) 150 mg 24 hr capsule Take 1 capsule (150 mg) by mouth once daily. 90 capsule 3    [DISCONTINUED] rosuvastatin (Crestor) 5 mg tablet Take 1 tablet (5 mg) by mouth once daily. (Patient not taking: Reported on 4/10/2024) 90 tablet 3     No current facility-administered medications on file prior to visit.     Immunization History   Administered Date(s) Administered    Influenza Whole 09/25/2013    Influenza, Unspecified 11/11/2003, 09/25/2021    "[employer] SARS-CoV-2 Vaccination 04/12/2021    Pfizer COVID-19 vaccine, bivalent, age 12 years and older (30 mcg/0.3 mL) 11/23/2022, 11/23/2022    Pfizer Purple Cap SARS-CoV-2 12/07/2021    Tdap vaccine, age 7 year and older (BOOSTRIX, ADACEL) 09/29/2015, 12/08/2019    Tetanus Toxoid, Unspecified 09/29/2015     Patient's medical history was reviewed and updated either before or during this encounter.       Jamie Reece MD

## 2024-04-10 NOTE — PATIENT INSTRUCTIONS
Diagnoses and all orders for this visit:  Hypothyroidism due to Hashimoto's thyroiditis  -     levothyroxine (Synthroid, Levoxyl) 50 mcg tablet; Take 1 tablet (50 mcg) by mouth once daily.  -     thyroid, pork, 15 mg tablet; Take 1 tablet (15 mg) by mouth once daily.  -     TSH with reflex to Free T4 if abnormal; Future  -     TSH with reflex to Free T4 if abnormal; Future  Neutropenia, unspecified type (CMS/HCC)  -     CBC and Auto Differential; Future  -     CBC and Auto Differential; Future  Vitamin B12 deficiency  -     cyanocobalamin, vitamin B-12, (Vitamin B-12) 2,500 mcg tablet, sublingual SL tablet; Take 1 tablet (2,500 mcg) by mouth every 3 days.  -     multivitamin tablet; Take 1 tablet by mouth once daily. One-A-Day Women's 50+ Complete Multivitamin  -     Vitamin B12; Future  -     Folate; Future  Mixed hyperlipidemia  -     Hepatic Function Panel; Future  -     Lipid Panel; Future  Vitamin D deficiency  -     multivitamin tablet; Take 1 tablet by mouth once daily. One-A-Day Women's 50+ Complete Multivitamin  -     Vitamin D 25-Hydroxy,Total (for eval of Vitamin D levels); Future  Venous insufficiency  -     triamterene-hydrochlorothiazid (Maxzide-25) 37.5-25 mg tablet; Take 1 tablet by mouth once daily as needed (swelling).  Migraine syndrome  Anxiety  -     venlafaxine XR (Effexor-XR) 150 mg 24 hr capsule; Take 1 capsule (150 mg) by mouth once daily.  Encounter for routine laboratory testing  -     TSH with reflex to Free T4 if abnormal; Future  -     CBC and Auto Differential; Future  -     Basic Metabolic Panel; Future  -     Hepatic Function Panel; Future  -     Lipid Panel; Future  -     Hemoglobin A1C; Future  -     Vitamin D 25-Hydroxy,Total (for eval of Vitamin D levels); Future  -     TSH with reflex to Free T4 if abnormal; Future  -     Vitamin B12; Future  -     Folate; Future  -     CBC and Auto Differential; Future  -     Follow Up In Primary Care; Future  Hyperglycemia  -     Hemoglobin  A1C; Future     - Significant medication and problem list reconciliation done today.  - Blood pressure looks great. No changes at this time.  - Patient needs mild non-fasting labwork. Ordered today. Will also order fasting labwork for the patient's next appointment.  - Patient noting some stressors / anxiety. Counseled lifestyle modification. I do not think that we need to peggy with medication at this time.

## 2024-05-30 ENCOUNTER — TELEPHONE (OUTPATIENT)
Dept: PRIMARY CARE | Facility: CLINIC | Age: 59
End: 2024-05-30
Payer: COMMERCIAL

## 2024-05-30 DIAGNOSIS — M79.671 PAIN IN BOTH FEET: ICD-10-CM

## 2024-05-30 DIAGNOSIS — M79.672 PAIN IN BOTH FEET: ICD-10-CM

## 2024-05-30 NOTE — TELEPHONE ENCOUNTER
"Pt states 1 mo ago, fell (tripped on dog toy) and \"my feet went backward\". Pt still has pain on top of bilat feet. Requests referral to ortho.  Pt prefers phone call with info, not mychart  "

## 2024-09-03 ENCOUNTER — HOSPITAL ENCOUNTER (OUTPATIENT)
Dept: RADIOLOGY | Facility: EXTERNAL LOCATION | Age: 59
Discharge: HOME | End: 2024-09-03

## 2024-09-03 DIAGNOSIS — S59.902A ELBOW INJURY, LEFT, INITIAL ENCOUNTER: ICD-10-CM

## 2024-09-03 DIAGNOSIS — S69.92XA LEFT WRIST INJURY, INITIAL ENCOUNTER: ICD-10-CM

## 2024-10-16 ENCOUNTER — LAB (OUTPATIENT)
Dept: LAB | Facility: LAB | Age: 59
End: 2024-10-16

## 2024-10-16 ENCOUNTER — OFFICE VISIT (OUTPATIENT)
Dept: PRIMARY CARE | Facility: CLINIC | Age: 59
End: 2024-10-16
Payer: COMMERCIAL

## 2024-10-16 VITALS
WEIGHT: 160 LBS | SYSTOLIC BLOOD PRESSURE: 112 MMHG | BODY MASS INDEX: 28.35 KG/M2 | HEIGHT: 63 IN | DIASTOLIC BLOOD PRESSURE: 62 MMHG | HEART RATE: 70 BPM | OXYGEN SATURATION: 99 % | TEMPERATURE: 97.1 F

## 2024-10-16 DIAGNOSIS — E78.2 MIXED HYPERLIPIDEMIA: ICD-10-CM

## 2024-10-16 DIAGNOSIS — I87.2 VENOUS INSUFFICIENCY: ICD-10-CM

## 2024-10-16 DIAGNOSIS — G43.909 MIGRAINE SYNDROME: ICD-10-CM

## 2024-10-16 DIAGNOSIS — E06.3 HYPOTHYROIDISM DUE TO HASHIMOTO THYROIDITIS: ICD-10-CM

## 2024-10-16 DIAGNOSIS — R73.9 HYPERGLYCEMIA: ICD-10-CM

## 2024-10-16 DIAGNOSIS — Z00.00 ANNUAL PHYSICAL EXAM: ICD-10-CM

## 2024-10-16 DIAGNOSIS — Z01.89 ENCOUNTER FOR ROUTINE LABORATORY TESTING: ICD-10-CM

## 2024-10-16 DIAGNOSIS — E53.8 VITAMIN B12 DEFICIENCY: ICD-10-CM

## 2024-10-16 DIAGNOSIS — E55.9 VITAMIN D DEFICIENCY: ICD-10-CM

## 2024-10-16 DIAGNOSIS — E06.3 HYPOTHYROIDISM DUE TO HASHIMOTO THYROIDITIS: Primary | ICD-10-CM

## 2024-10-16 DIAGNOSIS — F41.9 ANXIETY: ICD-10-CM

## 2024-10-16 LAB
25(OH)D3 SERPL-MCNC: 26 NG/ML (ref 30–100)
ALBUMIN SERPL BCP-MCNC: 4.5 G/DL (ref 3.4–5)
ALP SERPL-CCNC: 66 U/L (ref 33–136)
ALT SERPL W P-5'-P-CCNC: 13 U/L (ref 7–45)
ANION GAP SERPL CALCULATED.3IONS-SCNC: 11 MMOL/L (ref 10–20)
AST SERPL W P-5'-P-CCNC: 15 U/L (ref 9–39)
BASOPHILS # BLD AUTO: 0.02 X10*3/UL (ref 0–0.1)
BASOPHILS NFR BLD AUTO: 0.6 %
BILIRUB DIRECT SERPL-MCNC: 0.1 MG/DL (ref 0–0.3)
BILIRUB SERPL-MCNC: 0.7 MG/DL (ref 0–1.2)
BUN SERPL-MCNC: 15 MG/DL (ref 6–23)
CALCIUM SERPL-MCNC: 9.6 MG/DL (ref 8.6–10.3)
CHLORIDE SERPL-SCNC: 102 MMOL/L (ref 98–107)
CHOLEST SERPL-MCNC: 259 MG/DL (ref 0–199)
CHOLEST/HDLC SERPL: 4.4 {RATIO}
CO2 SERPL-SCNC: 27 MMOL/L (ref 21–32)
CREAT SERPL-MCNC: 0.77 MG/DL (ref 0.5–1.05)
EGFRCR SERPLBLD CKD-EPI 2021: 88 ML/MIN/1.73M*2
EOSINOPHIL # BLD AUTO: 0.01 X10*3/UL (ref 0–0.7)
EOSINOPHIL NFR BLD AUTO: 0.3 %
ERYTHROCYTE [DISTWIDTH] IN BLOOD BY AUTOMATED COUNT: 12 % (ref 11.5–14.5)
EST. AVERAGE GLUCOSE BLD GHB EST-MCNC: 94 MG/DL
GLUCOSE SERPL-MCNC: 84 MG/DL (ref 74–99)
HBA1C MFR BLD: 4.9 %
HCT VFR BLD AUTO: 40.2 % (ref 36–46)
HDLC SERPL-MCNC: 59.5 MG/DL
HGB BLD-MCNC: 13.8 G/DL (ref 12–16)
IMM GRANULOCYTES # BLD AUTO: 0.01 X10*3/UL (ref 0–0.7)
IMM GRANULOCYTES NFR BLD AUTO: 0.3 % (ref 0–0.9)
LDLC SERPL CALC-MCNC: 160 MG/DL
LYMPHOCYTES # BLD AUTO: 1.3 X10*3/UL (ref 1.2–4.8)
LYMPHOCYTES NFR BLD AUTO: 41.4 %
MCH RBC QN AUTO: 30.9 PG (ref 26–34)
MCHC RBC AUTO-ENTMCNC: 34.3 G/DL (ref 32–36)
MCV RBC AUTO: 90 FL (ref 80–100)
MONOCYTES # BLD AUTO: 0.19 X10*3/UL (ref 0.1–1)
MONOCYTES NFR BLD AUTO: 6.1 %
NEUTROPHILS # BLD AUTO: 1.61 X10*3/UL (ref 1.2–7.7)
NEUTROPHILS NFR BLD AUTO: 51.3 %
NON HDL CHOLESTEROL: 200 MG/DL (ref 0–149)
NRBC BLD-RTO: 0 /100 WBCS (ref 0–0)
PLATELET # BLD AUTO: 251 X10*3/UL (ref 150–450)
POTASSIUM SERPL-SCNC: 4.3 MMOL/L (ref 3.5–5.3)
PROT SERPL-MCNC: 7.1 G/DL (ref 6.4–8.2)
RBC # BLD AUTO: 4.47 X10*6/UL (ref 4–5.2)
SODIUM SERPL-SCNC: 136 MMOL/L (ref 136–145)
TRIGL SERPL-MCNC: 200 MG/DL (ref 0–149)
TSH SERPL-ACNC: 1.55 MIU/L (ref 0.44–3.98)
VIT B12 SERPL-MCNC: 577 PG/ML (ref 211–911)
VLDL: 40 MG/DL (ref 0–40)
WBC # BLD AUTO: 3.1 X10*3/UL (ref 4.4–11.3)

## 2024-10-16 PROCEDURE — 82248 BILIRUBIN DIRECT: CPT

## 2024-10-16 PROCEDURE — 84443 ASSAY THYROID STIM HORMONE: CPT

## 2024-10-16 PROCEDURE — 83036 HEMOGLOBIN GLYCOSYLATED A1C: CPT

## 2024-10-16 PROCEDURE — 82306 VITAMIN D 25 HYDROXY: CPT

## 2024-10-16 PROCEDURE — 82607 VITAMIN B-12: CPT

## 2024-10-16 PROCEDURE — 80053 COMPREHEN METABOLIC PANEL: CPT

## 2024-10-16 PROCEDURE — 36415 COLL VENOUS BLD VENIPUNCTURE: CPT

## 2024-10-16 PROCEDURE — 85025 COMPLETE CBC W/AUTO DIFF WBC: CPT

## 2024-10-16 PROCEDURE — 80061 LIPID PANEL: CPT

## 2024-10-16 RX ORDER — LEVOTHYROXINE SODIUM 50 UG/1
50 TABLET ORAL DAILY
Qty: 90 TABLET | Refills: 3 | Status: SHIPPED | OUTPATIENT
Start: 2024-10-16 | End: 2025-10-16

## 2024-10-16 RX ORDER — TRIAMTERENE/HYDROCHLOROTHIAZID 37.5-25 MG
1 TABLET ORAL DAILY PRN
Qty: 30 TABLET | Refills: 0 | Status: SHIPPED | OUTPATIENT
Start: 2024-10-16

## 2024-10-16 RX ORDER — VENLAFAXINE HYDROCHLORIDE 150 MG/1
150 CAPSULE, EXTENDED RELEASE ORAL DAILY
Qty: 90 CAPSULE | Refills: 3 | Status: SHIPPED | OUTPATIENT
Start: 2024-10-16 | End: 2025-10-16

## 2024-10-16 ASSESSMENT — ENCOUNTER SYMPTOMS
GASTROINTESTINAL NEGATIVE: 1
CONSTITUTIONAL NEGATIVE: 1
RESPIRATORY NEGATIVE: 1
CARDIOVASCULAR NEGATIVE: 1

## 2024-10-16 ASSESSMENT — PATIENT HEALTH QUESTIONNAIRE - PHQ9
2. FEELING DOWN, DEPRESSED OR HOPELESS: NOT AT ALL
SUM OF ALL RESPONSES TO PHQ9 QUESTIONS 1 AND 2: 0
1. LITTLE INTEREST OR PLEASURE IN DOING THINGS: NOT AT ALL

## 2024-10-16 ASSESSMENT — PAIN SCALES - GENERAL: PAINLEVEL_OUTOF10: 0-NO PAIN

## 2024-10-16 NOTE — PROGRESS NOTES
[facility]: [facility]  PROGRESS NOTE      Rebeca Nicolas is a 59 y.o. female that is presenting today for Follow-up.    Assessment/Plan   - Overall, the patient feels well and denies any acute symptoms / concerns at this time.  - Blood pressure at goal today.  - Encouraged continued dietary, exercise, and lifestyle modification.  - Significant medication and problem list reconciliation done today.   - Patient appears to be due for labwork. Ordered today.  - Will order labwork for the patient's next appointment. Encouraged the patient to get this labwork done one week prior to the next appointment.    Diagnoses and all orders for this visit:  Hypothyroidism due to Hashimoto thyroiditis  -     Follow Up In Primary Care  -     levothyroxine (Synthroid, Levoxyl) 50 mcg tablet; Take 1 tablet (50 mcg) by mouth once daily.  -     TSH with reflex to Free T4 if abnormal; Future  -     CBC and Auto Differential; Future  Vitamin B12 deficiency  -     Follow Up In Primary Care  -     Vitamin B12; Future  Mixed hyperlipidemia  -     Follow Up In Primary Care  -     CBC and Auto Differential; Future  -     Hepatic Function Panel; Future  -     Lipid Panel; Future  Vitamin D deficiency  -     Follow Up In Primary Care  -     Vitamin D 25-Hydroxy,Total (for eval of Vitamin D levels); Future  Venous insufficiency  -     triamterene-hydrochlorothiazid (Maxzide-25) 37.5-25 mg tablet; Take 1 tablet by mouth once daily as needed (swelling).  -     Basic Metabolic Panel; Future  -     CBC and Auto Differential; Future  -     Basic Metabolic Panel; Future  -     TSH with reflex to Free T4 if abnormal; Future  Migraine syndrome  -     Follow Up In Primary Care  -     CBC and Auto Differential; Future  Anxiety  -     Follow Up In Primary Care  -     venlafaxine XR (Effexor-XR) 150 mg 24 hr capsule; Take 1 capsule (150 mg) by mouth once daily.  Encounter for routine laboratory testing  Annual physical exam  -      Follow Up In Primary Care; Future  Hyperglycemia  -     Hemoglobin A1C; Future    Current Outpatient Medications   Medication Instructions    cyanocobalamin (vitamin B-12) (VITAMIN B-12) 2,500 mcg, oral, Every 3 days    levothyroxine (SYNTHROID, LEVOXYL) 50 mcg, oral, Daily    lifitegrast (Xiidra) 5 % dropperette 0 Refill(s), Type: Maintenance    multivitamin tablet 1 tablet, oral, Daily, One-A-Day Women's 50+ Complete Multivitamin    triamterene-hydrochlorothiazid (Maxzide-25) 37.5-25 mg tablet 1 tablet, oral, Daily PRN    venlafaxine XR (EFFEXOR-XR) 150 mg, oral, Daily     Subjective   - The patient otherwise feels well and denies any acute symptoms or concerns at this time.  - The patient denies any changes or progression of their chronic medical problems.  - The patient denies any problems or concerns with their medications.      Review of Systems   Constitutional: Negative.    Respiratory: Negative.     Cardiovascular: Negative.    Gastrointestinal: Negative.    All other systems reviewed and are negative.     Objective   Vitals:    10/16/24 0903   BP: 112/62   Pulse: 70   Temp: 36.2 °C (97.1 °F)   SpO2: 99%      Body mass index is 28.34 kg/m².  Physical Exam  Vitals and nursing note reviewed.   Constitutional:       General: She is not in acute distress.  Neck:      Vascular: No carotid bruit.   Cardiovascular:      Rate and Rhythm: Normal rate and regular rhythm.      Heart sounds: Normal heart sounds.   Pulmonary:      Effort: Pulmonary effort is normal.      Breath sounds: Normal breath sounds.   Musculoskeletal:         General: No swelling.   Neurological:      Mental Status: She is alert. Mental status is at baseline.   Psychiatric:         Mood and Affect: Mood normal.       Diagnostic Results   Lab Results   Component Value Date    GLUCOSE 81 10/04/2023    CALCIUM 9.5 10/04/2023     10/04/2023    K 4.2 10/04/2023    CO2 25 10/04/2023     10/04/2023    BUN 11 10/04/2023    CREATININE 0.80  "10/04/2023     Lab Results   Component Value Date    ALT 14 10/04/2023    AST 17 10/04/2023    ALKPHOS 82 10/04/2023    BILITOT 0.4 10/04/2023     Lab Results   Component Value Date    WBC 3.5 (L) 04/10/2024    HGB 13.5 04/10/2024    HCT 40.7 04/10/2024    MCV 91 04/10/2024     04/10/2024     Lab Results   Component Value Date    CHOL 269 (H) 10/04/2023     Lab Results   Component Value Date    HDL 56.0 10/04/2023     Lab Results   Component Value Date    LDLCALC 174 (H) 10/04/2023     Lab Results   Component Value Date    TRIG 193 (H) 10/04/2023     No components found for: \"CHOLHDL\"  Lab Results   Component Value Date    HGBA1C 5.0 10/04/2023     Other labs not included in the list above were reviewed either before or during this encounter.    History    Past Medical History:   Diagnosis Date    Anxiety and depression     Left wrist fracture 09/01/2024    Thyroid disease      Past Surgical History:   Procedure Laterality Date    BREAST BIOPSY Left     long ago    CHOLECYSTECTOMY  05/15/2014    Cholecystectomy    OTHER SURGICAL HISTORY  05/15/2014    Gynecologic Serv Endometrial Cryoablation W/ Ultrason Guid    OTHER SURGICAL HISTORY  11/29/2018    Hand surgery     Family History   Problem Relation Name Age of Onset    Diabetes Mother      Hypertension Mother      No Known Problems Father       Social History     Socioeconomic History    Marital status:      Spouse name: Not on file    Number of children: Not on file    Years of education: Not on file    Highest education level: Not on file   Occupational History    Not on file   Tobacco Use    Smoking status: Never    Smokeless tobacco: Never   Vaping Use    Vaping status: Never Used   Substance and Sexual Activity    Alcohol use: Yes     Alcohol/week: 1.0 - 2.0 standard drink of alcohol     Types: 1 - 2 Glasses of wine per week     Comment: a couple of times a week    Drug use: Never    Sexual activity: Not on file   Other Topics Concern    Not on " file   Social History Narrative    Not on file     Social Drivers of Health     Financial Resource Strain: Not on file   Food Insecurity: Not on file   Transportation Needs: Not on file   Physical Activity: Not on file   Stress: Not on file   Social Connections: Not on file   Intimate Partner Violence: Not on file   Housing Stability: Not on file     No Known Allergies  Current Outpatient Medications on File Prior to Visit   Medication Sig Dispense Refill    cyanocobalamin, vitamin B-12, (Vitamin B-12) 2,500 mcg tablet, sublingual SL tablet Take 1 tablet (2,500 mcg) by mouth every 3 days.      lifitegrast (Xiidra) 5 % dropperette 0 Refill(s), Type: Maintenance      multivitamin tablet Take 1 tablet by mouth once daily. One-A-Day Women's 50+ Complete Multivitamin      [DISCONTINUED] levothyroxine (Synthroid, Levoxyl) 50 mcg tablet Take 1 tablet (50 mcg) by mouth once daily. 90 tablet 3    [DISCONTINUED] thyroid, pork, 15 mg tablet Take 1 tablet (15 mg) by mouth once daily. 90 tablet 3    [DISCONTINUED] triamterene-hydrochlorothiazid (Maxzide-25) 37.5-25 mg tablet Take 1 tablet by mouth once daily as needed (swelling). 90 tablet 0    [DISCONTINUED] venlafaxine XR (Effexor-XR) 150 mg 24 hr capsule Take 1 capsule (150 mg) by mouth once daily. 90 capsule 3     No current facility-administered medications on file prior to visit.     Immunization History   Administered Date(s) Administered    Influenza Whole 09/25/2013    Influenza, Unspecified 11/11/2003, 09/25/2021    Philtro SARS-CoV-2 Vaccination 04/12/2021    Pfizer COVID-19 vaccine, bivalent, age 12 years and older (30 mcg/0.3 mL) 11/23/2022, 11/23/2022    Pfizer Purple Cap SARS-CoV-2 12/07/2021    Tdap vaccine, age 7 year and older (BOOSTRIX, ADACEL) 09/29/2015, 12/08/2019    Tetanus Toxoid, Unspecified 09/29/2015     Patient's medical history was reviewed and updated either before or during this encounter.       Jamie Reece MD

## 2024-10-16 NOTE — PATIENT INSTRUCTIONS
- Overall, the patient feels well and denies any acute symptoms / concerns at this time.  - Blood pressure at goal today.  - Encouraged continued dietary, exercise, and lifestyle modification.  - Significant medication and problem list reconciliation done today.   - Patient appears to be due for labwork. Ordered today.  - Will order labwork for the patient's next appointment. Encouraged the patient to get this labwork done one week prior to the next appointment.

## 2025-04-19 LAB
ANION GAP SERPL CALCULATED.4IONS-SCNC: 10 MMOL/L (CALC) (ref 7–17)
BUN SERPL-MCNC: 12 MG/DL (ref 7–25)
BUN/CREAT SERPL: NORMAL (CALC) (ref 6–22)
CALCIUM SERPL-MCNC: 9.2 MG/DL (ref 8.6–10.4)
CHLORIDE SERPL-SCNC: 103 MMOL/L (ref 98–110)
CO2 SERPL-SCNC: 25 MMOL/L (ref 20–32)
CREAT SERPL-MCNC: 0.78 MG/DL (ref 0.5–1.05)
EGFRCR SERPLBLD CKD-EPI 2021: 87 ML/MIN/1.73M2
GLUCOSE SERPL-MCNC: 87 MG/DL (ref 65–99)
POTASSIUM SERPL-SCNC: 4.1 MMOL/L (ref 3.5–5.3)
SODIUM SERPL-SCNC: 138 MMOL/L (ref 135–146)
TSH SERPL-ACNC: 2.35 MIU/L (ref 0.4–4.5)

## 2025-04-23 ENCOUNTER — OFFICE VISIT (OUTPATIENT)
Dept: PRIMARY CARE | Facility: CLINIC | Age: 60
End: 2025-04-23
Payer: COMMERCIAL

## 2025-04-23 VITALS
SYSTOLIC BLOOD PRESSURE: 132 MMHG | BODY MASS INDEX: 28.7 KG/M2 | DIASTOLIC BLOOD PRESSURE: 80 MMHG | OXYGEN SATURATION: 98 % | WEIGHT: 162 LBS | HEART RATE: 77 BPM | HEIGHT: 63 IN

## 2025-04-23 DIAGNOSIS — Z01.89 ENCOUNTER FOR ROUTINE LABORATORY TESTING: ICD-10-CM

## 2025-04-23 DIAGNOSIS — Z23 ENCOUNTER FOR IMMUNIZATION: ICD-10-CM

## 2025-04-23 DIAGNOSIS — F41.9 ANXIETY: ICD-10-CM

## 2025-04-23 DIAGNOSIS — G43.909 MIGRAINE SYNDROME: ICD-10-CM

## 2025-04-23 DIAGNOSIS — Z00.00 ANNUAL PHYSICAL EXAM: Primary | ICD-10-CM

## 2025-04-23 DIAGNOSIS — E55.9 VITAMIN D DEFICIENCY: ICD-10-CM

## 2025-04-23 DIAGNOSIS — D70.9 NEUTROPENIA, UNSPECIFIED TYPE: ICD-10-CM

## 2025-04-23 DIAGNOSIS — E78.2 MIXED HYPERLIPIDEMIA: ICD-10-CM

## 2025-04-23 DIAGNOSIS — Z12.4 ENCOUNTER FOR SCREENING FOR CERVICAL CANCER: ICD-10-CM

## 2025-04-23 DIAGNOSIS — Z12.31 ENCOUNTER FOR SCREENING MAMMOGRAM FOR BREAST CANCER: ICD-10-CM

## 2025-04-23 DIAGNOSIS — Z12.12 ENCOUNTER FOR COLORECTAL CANCER SCREENING: ICD-10-CM

## 2025-04-23 DIAGNOSIS — Z12.11 ENCOUNTER FOR COLORECTAL CANCER SCREENING: ICD-10-CM

## 2025-04-23 DIAGNOSIS — R73.9 HYPERGLYCEMIA: ICD-10-CM

## 2025-04-23 DIAGNOSIS — E06.3 HYPOTHYROIDISM DUE TO HASHIMOTO THYROIDITIS: ICD-10-CM

## 2025-04-23 DIAGNOSIS — E53.8 VITAMIN B12 DEFICIENCY: ICD-10-CM

## 2025-04-23 PROCEDURE — 1036F TOBACCO NON-USER: CPT | Performed by: STUDENT IN AN ORGANIZED HEALTH CARE EDUCATION/TRAINING PROGRAM

## 2025-04-23 PROCEDURE — 3008F BODY MASS INDEX DOCD: CPT | Performed by: STUDENT IN AN ORGANIZED HEALTH CARE EDUCATION/TRAINING PROGRAM

## 2025-04-23 PROCEDURE — 99396 PREV VISIT EST AGE 40-64: CPT | Performed by: STUDENT IN AN ORGANIZED HEALTH CARE EDUCATION/TRAINING PROGRAM

## 2025-04-23 ASSESSMENT — ENCOUNTER SYMPTOMS
EYES NEGATIVE: 1
ENDOCRINE NEGATIVE: 1
HEMATOLOGIC/LYMPHATIC NEGATIVE: 1
ALLERGIC/IMMUNOLOGIC NEGATIVE: 1
MUSCULOSKELETAL NEGATIVE: 1
CONSTITUTIONAL NEGATIVE: 1
NEUROLOGICAL NEGATIVE: 1
CARDIOVASCULAR NEGATIVE: 1
GASTROINTESTINAL NEGATIVE: 1
RESPIRATORY NEGATIVE: 1
PSYCHIATRIC NEGATIVE: 1

## 2025-04-23 ASSESSMENT — PATIENT HEALTH QUESTIONNAIRE - PHQ9
1. LITTLE INTEREST OR PLEASURE IN DOING THINGS: NOT AT ALL
SUM OF ALL RESPONSES TO PHQ9 QUESTIONS 1 AND 2: 0
2. FEELING DOWN, DEPRESSED OR HOPELESS: NOT AT ALL

## 2025-04-23 ASSESSMENT — PAIN SCALES - GENERAL: PAINLEVEL_OUTOF10: 0-NO PAIN

## 2025-04-23 NOTE — PROGRESS NOTES
[facility]: [facility]  PHYSICAL EXAM      Rebeca Nicolas is a 59 y.o. female that is presenting today for Annual Exam.    Assessment/Plan   - Overall, the patient feels well and denies any acute symptoms / concerns at this time.  - Blood pressure at goal today.  - Encouraged continued dietary, exercise, and lifestyle modification.  - Significant medication and problem list reconciliation done today.     Discussed routine and/or preventative care with the patient as outlined below:  - Labwork:   - Patient had labwork done for this appointment. Discussed today. Everything looked great.  - Will order labwork for the patient's next appointment. Encouraged the patient to get this labwork done one week prior to the next appointment.  - Imaging:   - Mammogram: Patient appears to be due. Ordered today.  - Colorectal Cancer: Patient not due for this until 2031.  - Encouraged the patient to continue to get her routine cervical cancer screening.  - Immunizations:   - Encouraged the patient to get their shingles (shingrix) immunizations.     Diagnoses and all orders for this visit:  Annual physical exam  -     Follow Up In Primary Care  Encounter for screening mammogram for breast cancer  -     BI mammo bilateral screening tomosynthesis; Future  Encounter for screening for cervical cancer  -     Referral to Obstetrics / Gynecology; Future  Encounter for colorectal cancer screening  Encounter for routine laboratory testing  Hyperglycemia  -     Hemoglobin A1C; Future  Encounter for immunization  Hypothyroidism due to Hashimoto thyroiditis  -     TSH with reflex to Free T4 if abnormal; Future  Neutropenia, unspecified type  -     CBC and Auto Differential; Future  Vitamin B12 deficiency  -     Vitamin B12; Future  Mixed hyperlipidemia  -     Basic Metabolic Panel; Future  -     Hepatic Function Panel; Future  -     Lipid Panel; Future  Vitamin D deficiency  -     Vitamin D 25-Hydroxy,Total (for eval of  Vitamin D levels); Future  Migraine syndrome  Anxiety    Current Outpatient Medications   Medication Instructions    cyanocobalamin (vitamin B-12) (VITAMIN B-12) 2,500 mcg, oral, Every 3 days    levothyroxine (SYNTHROID, LEVOXYL) 50 mcg, oral, Daily    lifitegrast (Xiidra) 5 % dropperette 0 Refill(s), Type: Maintenance    multivitamin tablet 1 tablet, oral, Daily, One-A-Day Women's 50+ Complete Multivitamin    triamterene-hydrochlorothiazid (Maxzide-25) 37.5-25 mg tablet 1 tablet, oral, Daily PRN    venlafaxine XR (EFFEXOR-XR) 150 mg, oral, Daily     Subjective   - The patient otherwise feels well and denies any acute symptoms or concerns at this time.  - The patient denies any changes or progression of their chronic medical problems.  - The patient denies any problems or concerns with their medications.      Review of Systems   Constitutional: Negative.    HENT: Negative.     Eyes: Negative.    Respiratory: Negative.     Cardiovascular: Negative.    Gastrointestinal: Negative.    Endocrine: Negative.    Genitourinary: Negative.    Musculoskeletal: Negative.    Skin: Negative.    Allergic/Immunologic: Negative.    Neurological: Negative.    Hematological: Negative.    Psychiatric/Behavioral: Negative.     All other systems reviewed and are negative.     Objective   Vitals:    04/23/25 0905   BP: 132/80   Pulse: 77   SpO2: 98%     Body mass index is 28.7 kg/m².  Physical Exam  Vitals and nursing note reviewed.   Constitutional:       General: She is not in acute distress.     Appearance: Normal appearance. She is not ill-appearing.   HENT:      Head: Normocephalic and atraumatic.      Right Ear: Tympanic membrane, ear canal and external ear normal. There is no impacted cerumen.      Left Ear: Tympanic membrane, ear canal and external ear normal. There is no impacted cerumen.      Nose: Nose normal.      Mouth/Throat:      Mouth: Mucous membranes are moist.      Pharynx: Oropharynx is clear. No oropharyngeal exudate  or posterior oropharyngeal erythema.   Eyes:      General: No scleral icterus.        Right eye: No discharge.         Left eye: No discharge.      Extraocular Movements: Extraocular movements intact.      Conjunctiva/sclera: Conjunctivae normal.      Pupils: Pupils are equal, round, and reactive to light.   Neck:      Vascular: No carotid bruit.   Cardiovascular:      Rate and Rhythm: Normal rate and regular rhythm.      Pulses: Normal pulses.      Heart sounds: Normal heart sounds. No murmur heard.     No friction rub. No gallop.   Pulmonary:      Effort: Pulmonary effort is normal. No respiratory distress.      Breath sounds: Normal breath sounds.   Abdominal:      General: Abdomen is flat. Bowel sounds are normal. There is no distension.      Palpations: Abdomen is soft.      Tenderness: There is no abdominal tenderness.      Hernia: No hernia is present.   Musculoskeletal:         General: No swelling or tenderness. Normal range of motion.   Lymphadenopathy:      Cervical: No cervical adenopathy.   Skin:     General: Skin is warm and dry.      Capillary Refill: Capillary refill takes less than 2 seconds.      Coloration: Skin is not jaundiced.      Findings: No rash.   Neurological:      General: No focal deficit present.      Mental Status: She is alert and oriented to person, place, and time. Mental status is at baseline.   Psychiatric:         Mood and Affect: Mood normal.         Behavior: Behavior normal.       Diagnostic Results   Lab Results   Component Value Date    GLUCOSE 87 04/18/2025    CALCIUM 9.2 04/18/2025     04/18/2025    K 4.1 04/18/2025    CO2 25 04/18/2025     04/18/2025    BUN 12 04/18/2025    CREATININE 0.78 04/18/2025     Lab Results   Component Value Date    ALT 13 10/16/2024    AST 15 10/16/2024    ALKPHOS 66 10/16/2024    BILITOT 0.7 10/16/2024     Lab Results   Component Value Date    WBC 3.1 (L) 10/16/2024    HGB 13.8 10/16/2024    HCT 40.2 10/16/2024    MCV 90 10/16/2024  "    10/16/2024     Lab Results   Component Value Date    CHOL 259 (H) 10/16/2024    CHOL 269 (H) 10/04/2023     Lab Results   Component Value Date    HDL 59.5 10/16/2024    HDL 56.0 10/04/2023     Lab Results   Component Value Date    LDLCALC 160 (H) 10/16/2024    LDLCALC 174 (H) 10/04/2023     Lab Results   Component Value Date    TRIG 200 (H) 10/16/2024    TRIG 193 (H) 10/04/2023     No components found for: \"CHOLHDL\"  Lab Results   Component Value Date    HGBA1C 4.9 10/16/2024     Other labs not included in the list above were reviewed either before or during this encounter.    History   Medical History[1]  Surgical History[2]  Family History[3]  Social History     Socioeconomic History    Marital status:      Spouse name: Not on file    Number of children: Not on file    Years of education: Not on file    Highest education level: Not on file   Occupational History    Not on file   Tobacco Use    Smoking status: Never    Smokeless tobacco: Never   Vaping Use    Vaping status: Never Used   Substance and Sexual Activity    Alcohol use: Yes     Alcohol/week: 1.0 - 2.0 standard drink of alcohol     Types: 1 - 2 Glasses of wine per week     Comment: a couple of times a week    Drug use: Never    Sexual activity: Not on file   Other Topics Concern    Not on file   Social History Narrative    Not on file     Social Drivers of Health     Financial Resource Strain: Not on file   Food Insecurity: Not on file   Transportation Needs: Not on file   Physical Activity: Not on file   Stress: Not on file   Social Connections: Not on file   Intimate Partner Violence: Not on file   Housing Stability: Not on file     Allergies[4]  Medications Ordered Prior to Encounter[5]  Immunization History   Administered Date(s) Administered    COVID-19, mRNA, LNP-S, PF, 30 mcg/0.3 mL dose 12/07/2021    Influenza Whole 09/25/2013    Influenza, Unspecified 11/11/2003, 09/25/2021    Víctor SARS-CoV-2 Vaccination 04/12/2021    Pfizer " COVID-19 vaccine, bivalent, age 12 years and older (30 mcg/0.3 mL) 11/23/2022, 11/23/2022    Tdap vaccine, age 7 year and older (BOOSTRIX, ADACEL) 09/29/2015, 12/08/2019    Tetanus Toxoid, Unspecified 09/29/2015     Patient's medical history was reviewed and updated either before or during this encounter.       Jamie Reece MD         [1]   Past Medical History:  Diagnosis Date    Anxiety and depression     Left wrist fracture 09/01/2024    Thyroid disease    [2]   Past Surgical History:  Procedure Laterality Date    BREAST BIOPSY Left     long ago    CHOLECYSTECTOMY  05/15/2014    Cholecystectomy    OTHER SURGICAL HISTORY  05/15/2014    Gynecologic Serv Endometrial Cryoablation W/ Ultrason Guid    OTHER SURGICAL HISTORY  11/29/2018    Hand surgery   [3]   Family History  Problem Relation Name Age of Onset    Diabetes Mother      Hypertension Mother      No Known Problems Father     [4] No Known Allergies  [5]   Current Outpatient Medications on File Prior to Visit   Medication Sig Dispense Refill    cyanocobalamin, vitamin B-12, (Vitamin B-12) 2,500 mcg tablet, sublingual SL tablet Take 1 tablet (2,500 mcg) by mouth every 3 days.      levothyroxine (Synthroid, Levoxyl) 50 mcg tablet Take 1 tablet (50 mcg) by mouth once daily. 90 tablet 3    lifitegrast (Xiidra) 5 % dropperette 0 Refill(s), Type: Maintenance      multivitamin tablet Take 1 tablet by mouth once daily. One-A-Day Women's 50+ Complete Multivitamin      triamterene-hydrochlorothiazid (Maxzide-25) 37.5-25 mg tablet Take 1 tablet by mouth once daily as needed (swelling). 30 tablet 0    venlafaxine XR (Effexor-XR) 150 mg 24 hr capsule Take 1 capsule (150 mg) by mouth once daily. 90 capsule 3     No current facility-administered medications on file prior to visit.

## 2025-04-23 NOTE — PATIENT INSTRUCTIONS
- Overall, the patient feels well and denies any acute symptoms / concerns at this time.  - Blood pressure at goal today.  - Encouraged continued dietary, exercise, and lifestyle modification.  - Significant medication and problem list reconciliation done today.     Discussed routine and/or preventative care with the patient as outlined below:  - Labwork:   - Patient had labwork done for this appointment. Discussed today. Everything looked great.  - Will order labwork for the patient's next appointment. Encouraged the patient to get this labwork done one week prior to the next appointment.  - Imaging:   - Mammogram: Patient appears to be due. Ordered today.  - Colorectal Cancer: Patient not due for this until 2031.  - Encouraged the patient to continue to get her routine cervical cancer screening.  - Immunizations:   - Encouraged the patient to get their shingles (shingrix) immunizations.

## 2025-05-07 ENCOUNTER — HOSPITAL ENCOUNTER (OUTPATIENT)
Dept: RADIOLOGY | Facility: HOSPITAL | Age: 60
Discharge: HOME | End: 2025-05-07
Payer: COMMERCIAL

## 2025-05-07 VITALS — BODY MASS INDEX: 29.81 KG/M2 | HEIGHT: 62 IN | WEIGHT: 162 LBS

## 2025-05-07 DIAGNOSIS — Z12.31 ENCOUNTER FOR SCREENING MAMMOGRAM FOR BREAST CANCER: ICD-10-CM

## 2025-05-07 PROCEDURE — 77063 BREAST TOMOSYNTHESIS BI: CPT | Performed by: RADIOLOGY

## 2025-05-07 PROCEDURE — 77067 SCR MAMMO BI INCL CAD: CPT | Performed by: RADIOLOGY

## 2025-05-07 PROCEDURE — 77063 BREAST TOMOSYNTHESIS BI: CPT
